# Patient Record
Sex: MALE | Race: WHITE | NOT HISPANIC OR LATINO | Employment: FULL TIME | URBAN - METROPOLITAN AREA
[De-identification: names, ages, dates, MRNs, and addresses within clinical notes are randomized per-mention and may not be internally consistent; named-entity substitution may affect disease eponyms.]

---

## 2019-09-18 ENCOUNTER — OFFICE VISIT (OUTPATIENT)
Dept: OCCUPATIONAL MEDICINE | Facility: CLINIC | Age: 44
End: 2019-09-18

## 2019-09-18 VITALS
RESPIRATION RATE: 16 BRPM | BODY MASS INDEX: 20.4 KG/M2 | SYSTOLIC BLOOD PRESSURE: 130 MMHG | TEMPERATURE: 98.6 F | HEIGHT: 67 IN | HEART RATE: 76 BPM | WEIGHT: 130 LBS | DIASTOLIC BLOOD PRESSURE: 87 MMHG | OXYGEN SATURATION: 97 %

## 2019-09-18 DIAGNOSIS — Z02.4 ENCOUNTER FOR COMMERCIAL DRIVER MEDICAL EXAMINATION (CDME): ICD-10-CM

## 2019-09-18 PROCEDURE — 7100 PR DOT PHYSICAL: Performed by: PREVENTIVE MEDICINE

## 2019-09-18 SDOH — HEALTH STABILITY: MENTAL HEALTH: HOW MANY STANDARD DRINKS CONTAINING ALCOHOL DO YOU HAVE ON A TYPICAL DAY?: 1 OR 2

## 2019-09-18 SDOH — HEALTH STABILITY: MENTAL HEALTH: HOW OFTEN DO YOU HAVE A DRINK CONTAINING ALCOHOL?: 2-3 TIMES A WEEK

## 2019-09-18 SDOH — HEALTH STABILITY: MENTAL HEALTH: HOW OFTEN DO YOU HAVE 6 OR MORE DRINKS ON ONE OCCASION?: LESS THAN MONTHLY

## 2019-12-05 ENCOUNTER — NON-PROVIDER VISIT (OUTPATIENT)
Dept: MEDICAL GROUP | Facility: CLINIC | Age: 44
End: 2019-12-05

## 2019-12-05 DIAGNOSIS — Z02.83 ENCOUNTER FOR DRUG SCREENING: ICD-10-CM

## 2019-12-05 LAB
BREATH ALCOHOL COMMENT: NORMAL
POC BREATHALIZER: 0 PERCENT (ref 0–0.01)

## 2019-12-05 PROCEDURE — 8907 PR URINE COLLECT ONLY: Performed by: FAMILY MEDICINE

## 2019-12-05 PROCEDURE — 82075 ASSAY OF BREATH ETHANOL: CPT | Performed by: FAMILY MEDICINE

## 2019-12-05 NOTE — NON-PROVIDER
Mir Jha is a 44 y.o. male here for a non-provider visit for UDS Collect Only / [facility] Post-Accident NDOT

## 2019-12-30 ENCOUNTER — OFFICE VISIT (OUTPATIENT)
Dept: MEDICAL GROUP | Facility: CLINIC | Age: 44
End: 2019-12-30
Payer: COMMERCIAL

## 2019-12-30 VITALS
BODY MASS INDEX: 25.58 KG/M2 | OXYGEN SATURATION: 98 % | WEIGHT: 163 LBS | DIASTOLIC BLOOD PRESSURE: 100 MMHG | RESPIRATION RATE: 16 BRPM | HEART RATE: 100 BPM | SYSTOLIC BLOOD PRESSURE: 135 MMHG | TEMPERATURE: 99.3 F | HEIGHT: 67 IN

## 2019-12-30 DIAGNOSIS — R03.0 ELEVATED BLOOD PRESSURE READING: ICD-10-CM

## 2019-12-30 DIAGNOSIS — J01.01 ACUTE RECURRENT MAXILLARY SINUSITIS: ICD-10-CM

## 2019-12-30 DIAGNOSIS — J01.11 ACUTE RECURRENT FRONTAL SINUSITIS: ICD-10-CM

## 2019-12-30 PROBLEM — J32.1 FRONTAL SINUSITIS: Status: ACTIVE | Noted: 2019-12-30

## 2019-12-30 PROCEDURE — 99203 OFFICE O/P NEW LOW 30 MIN: CPT | Performed by: PHYSICIAN ASSISTANT

## 2019-12-30 RX ORDER — FLUTICASONE PROPIONATE 50 MCG
1 SPRAY, SUSPENSION (ML) NASAL DAILY
Qty: 16 G | Refills: 3 | Status: SHIPPED | OUTPATIENT
Start: 2019-12-30 | End: 2019-12-31 | Stop reason: SDUPTHER

## 2019-12-30 RX ORDER — AMOXICILLIN AND CLAVULANATE POTASSIUM 875; 125 MG/1; MG/1
1 TABLET, FILM COATED ORAL 2 TIMES DAILY
Qty: 20 TAB | Refills: 0 | Status: SHIPPED | OUTPATIENT
Start: 2019-12-30

## 2019-12-30 NOTE — PROGRESS NOTES
"cc:  Sinus issues    Subjective:     Mir Jha is a 44 y.o. male presenting for sinus issues      Patient presents to the office for sinus issues and is new to me.   Patient has had a headache for 4 days and states that the headache is pushing on his eyes.  This is not uncommon for him and they usually present this time a year.  He states that he will come in when he needs an abx and this will take care of the problem.  He has been congested. He states pain has been on the left but is now on the right and into the neck.  He has taken tylenol and sinex nasal spray.  It has eased the symptoms but it is not better.   Nothing makes it worse.  Patient has felt warm.      Review of systems:  See above.   Denies any symptoms unless previously indicated.        Current Outpatient Medications:   •  amoxicillin-clavulanate (AUGMENTIN) 875-125 MG Tab, Take 1 Tab by mouth 2 times a day., Disp: 20 Tab, Rfl: 0  •  fluticasone (FLONASE) 50 MCG/ACT nasal spray, Spray 1 Spray in nose every day., Disp: 16 g, Rfl: 3    Allergies, past medical history, past surgical history, family history, social history reviewed and updated    Objective:     Vitals: /100 (BP Location: Left arm, Patient Position: Sitting, BP Cuff Size: Adult)   Pulse 100   Temp 37.4 °C (99.3 °F) (Temporal)   Resp 16   Ht 1.702 m (5' 7\")   Wt 73.9 kg (163 lb)   SpO2 98%   BMI 25.53 kg/m²   General: Alert, pleasant, NAD  EYES:   PERRL, EOMI, no icterus or pallor.  Conjunctivae and lids normal.   HENT:  Normocephalic.  External ears normal.  Ear canals narrow with cerumen bilaterally.  No nasal drainage present.  Maxillary and frontal sinus tenderness upon palpation.  Oropharynx non-erythematous, mucous membranes moist.  Neck supple.     Heart: Regular rate and rhythm.  S1 and S2 normal.  No murmurs appreciated.  Respiratory: Normal respiratory effort.  Clear to auscultation bilaterally.  Abdomen: Not obese.  Skin: Warm, dry, no " rashes.  Musculoskeletal: Gait is normal.  Moves all extremities well.    Neurological: No tremors, sensation grossly intact, CN2-12 intact.  Psych:  Affect/mood is normal, judgement is good, memory is intact, grooming is appropriate.    Assessment/Plan:     Mir was seen today for head pain.    Diagnoses and all orders for this visit:    Acute recurrent frontal sinusitis  -     amoxicillin-clavulanate (AUGMENTIN) 875-125 MG Tab; Take 1 Tab by mouth 2 times a day.  -     fluticasone (FLONASE) 50 MCG/ACT nasal spray; Spray 1 Spray in nose every day.  Acute recurrent maxillary sinusitis  -     amoxicillin-clavulanate (AUGMENTIN) 875-125 MG Tab; Take 1 Tab by mouth 2 times a day.  -     fluticasone (FLONASE) 50 MCG/ACT nasal spray; Spray 1 Spray in nose every day.    Patient works outside as a .  Will treat with fluticasone and Augmentin at this time.  Follow-up in 4 weeks, sooner if needed.    Elevated blood pressure reading  -     Lipid Profile; Future  -     Comp Metabolic Panel; Future  -     TSH+FREE T4  -     VITAMIN D,25 HYDROXY; Future    We will recheck blood pressure at follow-up.  May be elevated due to the fact patient is not feeling well.  We will also obtain labs to evaluate further.        Return in about 4 weeks (around 1/27/2020), or if symptoms worsen or fail to improve.    Please note that this dictation was created using voice recognition software. I have made every reasonable attempt to correct obvious errors, but expect that there are errors of grammar and possible content that I did not discover before finalizing note.

## 2019-12-31 RX ORDER — FLUTICASONE PROPIONATE 50 MCG
2 SPRAY, SUSPENSION (ML) NASAL DAILY
Qty: 16 G | Refills: 3 | Status: SHIPPED | OUTPATIENT
Start: 2019-12-31

## 2020-01-14 ENCOUNTER — NON-PROVIDER VISIT (OUTPATIENT)
Dept: MEDICAL GROUP | Facility: CLINIC | Age: 45
End: 2020-01-14
Payer: COMMERCIAL

## 2020-01-14 DIAGNOSIS — R03.0 ELEVATED BLOOD PRESSURE READING: ICD-10-CM

## 2020-01-14 PROCEDURE — 36415 COLL VENOUS BLD VENIPUNCTURE: CPT | Performed by: FAMILY MEDICINE

## 2020-01-27 ENCOUNTER — OFFICE VISIT (OUTPATIENT)
Dept: MEDICAL GROUP | Facility: CLINIC | Age: 45
End: 2020-01-27
Payer: COMMERCIAL

## 2020-01-27 VITALS
RESPIRATION RATE: 16 BRPM | DIASTOLIC BLOOD PRESSURE: 87 MMHG | HEART RATE: 72 BPM | SYSTOLIC BLOOD PRESSURE: 124 MMHG | WEIGHT: 165 LBS | HEIGHT: 67 IN | OXYGEN SATURATION: 97 % | TEMPERATURE: 98.1 F | BODY MASS INDEX: 25.9 KG/M2

## 2020-01-27 DIAGNOSIS — E78.49 OTHER HYPERLIPIDEMIA: ICD-10-CM

## 2020-01-27 DIAGNOSIS — E55.9 VITAMIN D DEFICIENCY: ICD-10-CM

## 2020-01-27 DIAGNOSIS — R07.81 RIB PAIN ON RIGHT SIDE: ICD-10-CM

## 2020-01-27 PROCEDURE — 99213 OFFICE O/P EST LOW 20 MIN: CPT | Performed by: PHYSICIAN ASSISTANT

## 2020-01-27 RX ORDER — ERGOCALCIFEROL 1.25 MG/1
50000 CAPSULE ORAL
Qty: 4 CAP | Refills: 9 | Status: SHIPPED | OUTPATIENT
Start: 2020-01-27

## 2020-01-27 NOTE — PROGRESS NOTES
"cc:  Follow up    Subjective:     Mir Jha is a 44 y.o. male presenting for follow up      Patient presents to the office for follow up labs and blood pressure.  He states that he has been keeping track of his blood pressure and finds that when he has not in pain, his blood pressure is actually normal.  He denies any issues at this time but is here to discuss his lab results.    Patient does indicate about 2 weeks ago he was trying to defrost some ice around his property.  He states that he was on top of the fence and his foot slipped on some ice on the fence and he landed on the post.  He knows that he broke a rib on his right side as he felt a pop.  He states that he has done this several times before in the past.  He did not have x-rays to confirm.  He feels that his pain is improved and is not having any major issues at this time.    Review of systems:  See above.   Denies any symptoms unless previously indicated.        Current Outpatient Medications:   •  vitamin D, Ergocalciferol, (DRISDOL) 63954 units Cap capsule, Take 1 Cap by mouth every 7 days., Disp: 4 Cap, Rfl: 9  •  fluticasone (FLONASE) 50 MCG/ACT nasal spray, Spray 2 Sprays in nose every day. 2 sprays in each nostril daily (Patient not taking: Reported on 1/27/2020), Disp: 16 g, Rfl: 3  •  amoxicillin-clavulanate (AUGMENTIN) 875-125 MG Tab, Take 1 Tab by mouth 2 times a day. (Patient not taking: Reported on 1/27/2020), Disp: 20 Tab, Rfl: 0    Allergies, past medical history, past surgical history, family history, social history reviewed and updated    Objective:     Vitals: /87 (BP Location: Left arm, Patient Position: Sitting, BP Cuff Size: Adult)   Pulse 72   Temp 36.7 °C (98.1 °F) (Temporal)   Resp 16   Ht 1.702 m (5' 7\")   Wt 74.8 kg (165 lb)   SpO2 97%   BMI 25.84 kg/m²   General: Alert, pleasant, NAD  EYES:   PERRL, EOMI, no icterus or pallor.  Conjunctivae and lids normal.   HENT:  Normocephalic.  External ears normal. "  No nasal drainage present.   Neck supple.     Abdomen: Not obese  Skin: Warm, dry, no rashes.  Musculoskeletal: Gait is normal.  Moves all extremities well.    Neurological: No tremors, sensation grossly intact,  CN2-12 intact.  Psych:  Affect/mood is normal, judgement is good, memory is intact, grooming is appropriate.    Assessment/Plan:     Mir was seen today for follow-up.    Diagnoses and all orders for this visit:    Other hyperlipidemia  -     Lipid Profile; Future  -     Comp Metabolic Panel; Future  Vitamin D deficiency  -     vitamin D, Ergocalciferol, (DRISDOL) 41928 units Cap capsule; Take 1 Cap by mouth every 7 days.  -     VITAMIN D,25 HYDROXY; Future  Labs reviewed in office today.  Patient does have hyperlipidemia and vitamin D deficiency.  Recommend improve diet and exercise for patient.  However we will try a vitamin D supplement to help with the low vitamin D levels.  Recommend repeating labs in 6 to 9 months.    Rib pain on right side  -     [id number] (WITH 1-VIEW CXR) RIGHT; Future  Although most likely patient has fractured her rib, I would recommend following up with x-rays.  Patient does not feel that this is necessary but I have placed an order in case he feels as though his pain is not improving or resolves.          No follow-ups on file.    Please note that this dictation was created using voice recognition software. I have made every reasonable attempt to correct obvious errors, but expect that there are errors of grammar and possible content that I did not discover before finalizing note.